# Patient Record
Sex: FEMALE | Race: BLACK OR AFRICAN AMERICAN | Employment: FULL TIME | URBAN - METROPOLITAN AREA
[De-identification: names, ages, dates, MRNs, and addresses within clinical notes are randomized per-mention and may not be internally consistent; named-entity substitution may affect disease eponyms.]

---

## 2017-09-18 PROBLEM — M25.50 POLYARTHRALGIA: Status: ACTIVE | Noted: 2017-09-18

## 2017-09-18 PROCEDURE — 81001 URINALYSIS AUTO W/SCOPE: CPT | Performed by: INTERNAL MEDICINE

## 2017-09-18 PROCEDURE — 82570 ASSAY OF URINE CREATININE: CPT | Performed by: INTERNAL MEDICINE

## 2017-09-18 PROCEDURE — 86160 COMPLEMENT ANTIGEN: CPT | Performed by: INTERNAL MEDICINE

## 2017-09-18 PROCEDURE — 84156 ASSAY OF PROTEIN URINE: CPT | Performed by: INTERNAL MEDICINE

## 2017-09-18 PROCEDURE — 87186 SC STD MICRODIL/AGAR DIL: CPT | Performed by: INTERNAL MEDICINE

## 2017-09-18 PROCEDURE — 86038 ANTINUCLEAR ANTIBODIES: CPT | Performed by: INTERNAL MEDICINE

## 2017-09-18 PROCEDURE — 87077 CULTURE AEROBIC IDENTIFY: CPT | Performed by: INTERNAL MEDICINE

## 2017-09-18 PROCEDURE — 87086 URINE CULTURE/COLONY COUNT: CPT | Performed by: INTERNAL MEDICINE

## 2017-10-10 PROBLEM — M25.50 POLYARTHRALGIA: Status: RESOLVED | Noted: 2017-09-18 | Resolved: 2017-10-10

## 2017-10-10 PROBLEM — M06.9 RHEUMATOID ARTHRITIS (HCC): Status: RESOLVED | Noted: 2017-10-10 | Resolved: 2017-10-10

## 2017-10-10 PROBLEM — M06.9 RHEUMATOID ARTHRITIS (HCC): Status: ACTIVE | Noted: 2017-10-10

## 2017-10-10 PROBLEM — R76.8 RHEUMATOID FACTOR POSITIVE: Status: ACTIVE | Noted: 2017-10-10

## 2017-12-04 PROBLEM — M72.2 PLANTAR FASCIITIS: Status: ACTIVE | Noted: 2017-12-04

## 2017-12-05 PROBLEM — M05.79 RHEUMATOID ARTHRITIS INVOLVING MULTIPLE SITES WITH POSITIVE RHEUMATOID FACTOR (HCC): Status: ACTIVE | Noted: 2017-12-05

## 2017-12-05 PROBLEM — R76.8 RHEUMATOID FACTOR POSITIVE: Status: RESOLVED | Noted: 2017-10-10 | Resolved: 2017-12-05

## 2018-01-19 PROBLEM — M17.12 OSTEOARTHRITIS OF LEFT KNEE: Status: ACTIVE | Noted: 2018-01-19

## 2018-02-08 PROBLEM — M17.0 BILATERAL PRIMARY OSTEOARTHRITIS OF KNEE: Status: ACTIVE | Noted: 2018-02-08

## 2019-04-02 ENCOUNTER — OFFICE VISIT (OUTPATIENT)
Dept: SURGERY | Facility: CLINIC | Age: 54
End: 2019-04-02
Payer: COMMERCIAL

## 2019-04-02 VITALS
SYSTOLIC BLOOD PRESSURE: 169 MMHG | WEIGHT: 249 LBS | TEMPERATURE: 97 F | HEART RATE: 71 BPM | BODY MASS INDEX: 39.54 KG/M2 | DIASTOLIC BLOOD PRESSURE: 105 MMHG | RESPIRATION RATE: 20 BRPM | HEIGHT: 66.5 IN

## 2019-04-02 DIAGNOSIS — K64.5 EXTERNAL HEMORRHOID, THROMBOSED: Primary | ICD-10-CM

## 2019-04-02 PROCEDURE — 99243 OFF/OP CNSLTJ NEW/EST LOW 30: CPT | Performed by: COLON & RECTAL SURGERY

## 2019-04-04 ENCOUNTER — TELEPHONE (OUTPATIENT)
Dept: SURGERY | Facility: CLINIC | Age: 54
End: 2019-04-04

## 2019-04-04 ENCOUNTER — ANESTHESIA EVENT (OUTPATIENT)
Dept: SURGERY | Facility: HOSPITAL | Age: 54
End: 2019-04-04
Payer: COMMERCIAL

## 2019-04-04 DIAGNOSIS — K64.5 EXTERNAL THROMBOSED HEMORRHOIDS: Primary | ICD-10-CM

## 2019-04-04 NOTE — PATIENT INSTRUCTIONS
Assessment   External hemorrhoid, thrombosed  (primary encounter diagnosis)      Plan   Patient has thrombosed external hemorrhoids without active bleeding or tissue breakdown or necrosis.     There is not a discrete clot but can be easily evacuated here in

## 2019-04-04 NOTE — H&P (VIEW-ONLY)
New Patient Visit Note       Active Problems      1.  External hemorrhoid, thrombosed        Chief Complaint   Patient presents with:  Hemorrhoids: anal pain      History of Present Illness   Kyaw Gentleman is a 47year old female referred by Jaya Villegas    •      • TUBAL LIGATION         Family History   Problem Relation Age of Onset   • Asthma Father    • Cancer Son    • Stroke Sister    • Heart Disease Sister    • High Blood Pressure Sister    • Asthma Sister    • Other (lupus) Cousin constipation, diarrhea, nausea and vomiting. Endocrine: Negative for polydipsia and polyuria. Genitourinary: Negative for dysuria and hematuria. Musculoskeletal: Negative for arthralgias and myalgias. Skin: Negative for rash and wound.    Neurologic thrombus did extend somewhat into the anal canal, but not up to the anorectal junction. Anoscopy was not performed.            Assessment   External hemorrhoid, thrombosed  (primary encounter diagnosis)      Plan   Patient has thrombosed external hemorrh

## 2019-04-04 NOTE — H&P
New Patient Visit Note       Active Problems      1.  External hemorrhoid, thrombosed        Chief Complaint   Patient presents with:  Hemorrhoids: anal pain      History of Present Illness   Sunny Ortiz is a 47year old female referred by Gilda Costello    •      • TUBAL LIGATION         Family History   Problem Relation Age of Onset   • Asthma Father    • Cancer Son    • Stroke Sister    • Heart Disease Sister    • High Blood Pressure Sister    • Asthma Sister    • Other (lupus) Cousin constipation, diarrhea, nausea and vomiting. Endocrine: Negative for polydipsia and polyuria. Genitourinary: Negative for dysuria and hematuria. Musculoskeletal: Negative for arthralgias and myalgias. Skin: Negative for rash and wound.    Neurologic thrombus did extend somewhat into the anal canal, but not up to the anorectal junction. Anoscopy was not performed.            Assessment   External hemorrhoid, thrombosed  (primary encounter diagnosis)      Plan   Patient has thrombosed external hemorrh

## 2019-04-04 NOTE — TELEPHONE ENCOUNTER
Surgery scheduled today with Dr. Otoniel Conrad- reviewed surgical check list with pt, Pt verbalizes understanding.

## 2019-04-05 ENCOUNTER — HOSPITAL ENCOUNTER (OUTPATIENT)
Facility: HOSPITAL | Age: 54
Setting detail: HOSPITAL OUTPATIENT SURGERY
Discharge: HOME | End: 2019-04-05
Attending: COLON & RECTAL SURGERY | Admitting: COLON & RECTAL SURGERY
Payer: COMMERCIAL

## 2019-04-05 ENCOUNTER — ANESTHESIA (OUTPATIENT)
Dept: SURGERY | Facility: HOSPITAL | Age: 54
End: 2019-04-05
Payer: COMMERCIAL

## 2019-04-05 VITALS
RESPIRATION RATE: 16 BRPM | DIASTOLIC BLOOD PRESSURE: 94 MMHG | WEIGHT: 239.44 LBS | HEART RATE: 72 BPM | OXYGEN SATURATION: 97 % | SYSTOLIC BLOOD PRESSURE: 146 MMHG | TEMPERATURE: 98 F | BODY MASS INDEX: 38.03 KG/M2 | HEIGHT: 66.5 IN

## 2019-04-05 DIAGNOSIS — K64.5 EXTERNAL THROMBOSED HEMORRHOIDS: ICD-10-CM

## 2019-04-05 PROCEDURE — 46260 REMOVE IN/EX HEM GROUPS 2+: CPT | Performed by: COLON & RECTAL SURGERY

## 2019-04-05 PROCEDURE — 06BY4ZC EXCISION OF HEMORRHOIDAL PLEXUS, PERCUTANEOUS ENDOSCOPIC APPROACH: ICD-10-PCS | Performed by: COLON & RECTAL SURGERY

## 2019-04-05 RX ORDER — SCOLOPAMINE TRANSDERMAL SYSTEM 1 MG/1
1 PATCH, EXTENDED RELEASE TRANSDERMAL ONCE
Status: DISCONTINUED | OUTPATIENT
Start: 2019-04-05 | End: 2019-04-05

## 2019-04-05 RX ORDER — ACETAMINOPHEN 500 MG
1000 TABLET ORAL ONCE
COMMUNITY

## 2019-04-05 RX ORDER — BUPIVACAINE HYDROCHLORIDE AND EPINEPHRINE 2.5; 5 MG/ML; UG/ML
INJECTION, SOLUTION INFILTRATION; PERINEURAL AS NEEDED
Status: DISCONTINUED | OUTPATIENT
Start: 2019-04-05 | End: 2019-04-05 | Stop reason: HOSPADM

## 2019-04-05 RX ORDER — HYDROCODONE BITARTRATE AND ACETAMINOPHEN 5; 325 MG/1; MG/1
1 TABLET ORAL AS NEEDED
Status: DISCONTINUED | OUTPATIENT
Start: 2019-04-05 | End: 2019-04-05

## 2019-04-05 RX ORDER — OXYCODONE HYDROCHLORIDE 5 MG/1
5 TABLET ORAL EVERY 6 HOURS PRN
Qty: 30 TABLET | Refills: 0 | Status: SHIPPED | OUTPATIENT
Start: 2019-04-05

## 2019-04-05 RX ORDER — SODIUM CHLORIDE, SODIUM LACTATE, POTASSIUM CHLORIDE, CALCIUM CHLORIDE 600; 310; 30; 20 MG/100ML; MG/100ML; MG/100ML; MG/100ML
INJECTION, SOLUTION INTRAVENOUS CONTINUOUS
Status: DISCONTINUED | OUTPATIENT
Start: 2019-04-05 | End: 2019-04-05

## 2019-04-05 RX ORDER — METOCLOPRAMIDE HYDROCHLORIDE 5 MG/ML
10 INJECTION INTRAMUSCULAR; INTRAVENOUS AS NEEDED
Status: DISCONTINUED | OUTPATIENT
Start: 2019-04-05 | End: 2019-04-05

## 2019-04-05 RX ORDER — HEPARIN SODIUM 5000 [USP'U]/ML
5000 INJECTION, SOLUTION INTRAVENOUS; SUBCUTANEOUS ONCE
Status: COMPLETED | OUTPATIENT
Start: 2019-04-05 | End: 2019-04-05

## 2019-04-05 RX ORDER — NALOXONE HYDROCHLORIDE 0.4 MG/ML
80 INJECTION, SOLUTION INTRAMUSCULAR; INTRAVENOUS; SUBCUTANEOUS AS NEEDED
Status: DISCONTINUED | OUTPATIENT
Start: 2019-04-05 | End: 2019-04-05

## 2019-04-05 RX ORDER — HYDROCODONE BITARTRATE AND ACETAMINOPHEN 5; 325 MG/1; MG/1
2 TABLET ORAL AS NEEDED
Status: DISCONTINUED | OUTPATIENT
Start: 2019-04-05 | End: 2019-04-05

## 2019-04-05 RX ORDER — HYDROMORPHONE HYDROCHLORIDE 1 MG/ML
0.5 INJECTION, SOLUTION INTRAMUSCULAR; INTRAVENOUS; SUBCUTANEOUS EVERY 5 MIN PRN
Status: DISCONTINUED | OUTPATIENT
Start: 2019-04-05 | End: 2019-04-05

## 2019-04-05 RX ORDER — ONDANSETRON 2 MG/ML
4 INJECTION INTRAMUSCULAR; INTRAVENOUS AS NEEDED
Status: DISCONTINUED | OUTPATIENT
Start: 2019-04-05 | End: 2019-04-05

## 2019-04-05 RX ORDER — ACETAMINOPHEN 500 MG
1000 TABLET ORAL ONCE
Status: DISCONTINUED | OUTPATIENT
Start: 2019-04-05 | End: 2019-04-05

## 2019-04-05 NOTE — ANESTHESIA PREPROCEDURE EVALUATION
PRE-OP EVALUATION    Patient Name: Sabrina Cope    Pre-op Diagnosis: External thrombosed hemorrhoids [K64.5]    Procedure(s):  EXCISIONAL HEMORRHOIDECTOMY    Surgeon(s) and Role:     * Hilda Toney MD - Primary    Pre-op vitals reviewed.   Temp: anesthetic complications         GI/Hepatic/Renal    Negative GI/hepatic/renal ROS. Cardiovascular      ECG reviewed.   Exercise tolerance: good     MET: >4      (+) hypertension and poorly controlled  (+) hyperlipidemia

## 2019-04-05 NOTE — ANESTHESIA POSTPROCEDURE EVALUATION
[facility] Patient Status:  Hospital Outpatient Surgery   Age/Gender 47year old female MRN [id number]   Location [address] Attending Song Barriga MD   Hosp Day # 0 PCP Ramila North MD

## 2019-04-05 NOTE — INTERVAL H&P NOTE
Pre-op Diagnosis: External thrombosed hemorrhoids [K64.5]    The above referenced H&P was reviewed by Mercy Weinstein MD on 4/5/2019, the patient was examined and no significant changes have occurred in the patient's condition since the H&P was perfo

## 2019-04-05 NOTE — OPERATIVE REPORT
[facility]  Operative Report    Huan Sweeney Location: OR   [facility] [id number] N [id number]   Admission Date 4/5/2019 Operation Date 4/5/2019   Attending Physician Mya Christianson MD Operating Physician Marco Antonio Mccrary MD       Patient Name Betadine paint. Sterile drapes were placed with wide exposure of the perineum. Anoscopy was performed to reveal the above listed findings. Each hemorrhoid was treated in an identical fashion.      0.5% Marcaine with epinephrine was injected into all hemo

## 2019-04-21 NOTE — PROGRESS NOTES
Post Operative Visit Note       Active Problems  1. Follow-up examination    2. External thrombosed hemorrhoids    3.  Grade III internal hemorrhoids         Chief Complaint   Patient presents with:  Hemorrhoids: Pain, bleeding         History of Present Il 4/5/2019    Performed by Elizabeth Lopez MD at [facility] MAIN OR   • TUBAL LIGATION         The family history and social history have been reviewed by me today.     Family History   Problem Relation Age of Onset   • Asthma Father    • Cancer Son    • Yogi Silverman Review of Systems  The Review of Systems has been reviewed by me during today. Review of Systems   Constitutional: Negative for chills, diaphoresis, fatigue and fever. HENT: Negative for rhinorrhea, sore throat and trouble swallowing.     Eyes: Negativ skin were examined. The left lateral right posterior hemorrhoidectomy incisions were intact and there was a small amount of extruding Vicryl suture. This was snipped at the skin level. There was no bleeding. There is no erythema or induration.   Ther

## 2019-04-22 ENCOUNTER — OFFICE VISIT (OUTPATIENT)
Dept: SURGERY | Facility: CLINIC | Age: 54
End: 2019-04-22

## 2019-04-22 VITALS
WEIGHT: 249 LBS | SYSTOLIC BLOOD PRESSURE: 146 MMHG | TEMPERATURE: 98 F | HEIGHT: 66.5 IN | DIASTOLIC BLOOD PRESSURE: 97 MMHG | BODY MASS INDEX: 39.54 KG/M2 | HEART RATE: 71 BPM

## 2019-04-22 DIAGNOSIS — K64.5 EXTERNAL THROMBOSED HEMORRHOIDS: ICD-10-CM

## 2019-04-22 DIAGNOSIS — K64.2 GRADE III INTERNAL HEMORRHOIDS: ICD-10-CM

## 2019-04-22 DIAGNOSIS — Z09 FOLLOW-UP EXAMINATION: Primary | ICD-10-CM

## 2019-04-22 PROCEDURE — 99024 POSTOP FOLLOW-UP VISIT: CPT | Performed by: COLON & RECTAL SURGERY

## 2019-04-22 NOTE — PATIENT INSTRUCTIONS
Assessment   Follow-up examination  (primary encounter diagnosis)  External thrombosed hemorrhoids  Grade III internal hemorrhoids      Plan   The patient is doing well after 17 days status post excisional hemorrhoidectomy.     Pathology results were discus

## 2019-05-06 ENCOUNTER — OFFICE VISIT (OUTPATIENT)
Dept: SURGERY | Facility: CLINIC | Age: 54
End: 2019-05-06
Payer: COMMERCIAL

## 2019-05-06 VITALS
HEART RATE: 64 BPM | BODY MASS INDEX: 39.54 KG/M2 | RESPIRATION RATE: 18 BRPM | DIASTOLIC BLOOD PRESSURE: 96 MMHG | HEIGHT: 66.5 IN | SYSTOLIC BLOOD PRESSURE: 153 MMHG | WEIGHT: 249 LBS

## 2019-05-06 DIAGNOSIS — K64.5 EXTERNAL THROMBOSED HEMORRHOIDS: ICD-10-CM

## 2019-05-06 DIAGNOSIS — K64.2 GRADE III INTERNAL HEMORRHOIDS: ICD-10-CM

## 2019-05-06 DIAGNOSIS — Z09 FOLLOW-UP EXAMINATION: Primary | ICD-10-CM

## 2019-05-06 PROCEDURE — 46600 DIAGNOSTIC ANOSCOPY SPX: CPT | Performed by: COLON & RECTAL SURGERY

## 2019-05-06 PROCEDURE — 99024 POSTOP FOLLOW-UP VISIT: CPT | Performed by: COLON & RECTAL SURGERY

## 2019-05-06 NOTE — PROGRESS NOTES
Post Operative Visit Note       Active Problems  No diagnosis found. Chief Complaint   Patient presents with:  Post-Op: 2wk f/u s/p hemorrhoidectomy on 4/5. History of Present Illness         Allergies  Katie has No Known Allergies.     Past M 1 tablet (81 mg total) by mouth daily. , Disp: , Rfl: 0  •  oxyCODONE HCl 5 MG Oral Tab, Take 1 tablet (5 mg total) by mouth every 6 (six) hours as needed for Pain., Disp: 30 tablet, Rfl: 0  •  Hydrocortisone Ace-Pramoxine 2.5-1 % Rectal Cream, Apply to aff Gastrointestinal: Negative for abdominal distention, abdominal pain, anal bleeding, blood in stool, constipation, diarrhea, nausea and vomiting. Genitourinary: Negative for difficulty urinating, dysuria, frequency and urgency.    Musculoskeletal: Tor Stamp

## 2019-05-06 NOTE — PATIENT INSTRUCTIONS
Assessment   Follow-up examination  (primary encounter diagnosis)  External thrombosed hemorrhoids  Grade III internal hemorrhoids      Plan   The patient is doing well 1 month after 2 column hemorrhoidectomy.     Patient's incisions are almost completely

## 2019-05-06 NOTE — PROGRESS NOTES
Post Operative Visit Note       Active Problems  1. Follow-up examination    2. External thrombosed hemorrhoids    3.  Grade III internal hemorrhoids         Chief Complaint   Patient presents with:  Post-Op: Improving anal pain         History of Present I Sister    • Asthma Sister    • Other (lupus) Cousin      Social History    Tobacco Use      Smoking status: Never Smoker      Smokeless tobacco: Never Used    Alcohol use: Yes      Comment: social    Drug use: No       Current Outpatient Medications on Jaden shortness of breath and wheezing. Cardiovascular: Negative for chest pain and palpitations. Gastrointestinal: Negative for abdominal distention, abdominal pain, anal bleeding, blood in stool, constipation, diarrhea, nausea, rectal pain and vomiting. was performed. There was  good resting anal sphincter tone and no palpable mass. Anoscopy was performed. The anal canal and anorectal ring were examined circumferentially. There was not anal fissure and anal fistula.     There was no inflammation at th

## 2020-09-15 ENCOUNTER — APPOINTMENT (OUTPATIENT)
Dept: LAB | Facility: HOSPITAL | Age: 55
End: 2020-09-15
Attending: FAMILY MEDICINE
Payer: COMMERCIAL

## 2020-09-15 DIAGNOSIS — R52 BODY ACHES: ICD-10-CM

## 2020-09-15 DIAGNOSIS — Z20.822 SUSPECTED COVID-19 VIRUS INFECTION: ICD-10-CM

## 2020-09-15 DIAGNOSIS — R05.9 COUGH: ICD-10-CM

## 2020-09-15 DIAGNOSIS — R49.0 HOARSENESS OF VOICE: ICD-10-CM

## 2020-09-18 LAB — SARS-COV-2 RNA RESP QL NAA+PROBE: NOT DETECTED

## 2020-12-16 PROBLEM — Z79.899 HIGH RISK MEDICATION USE: Status: ACTIVE | Noted: 2020-12-16

## 2022-01-07 ENCOUNTER — EXTERNAL RECORD (OUTPATIENT)
Dept: HEALTH INFORMATION MANAGEMENT | Facility: OTHER | Age: 57
End: 2022-01-07

## 2022-01-07 ENCOUNTER — TELEPHONE (OUTPATIENT)
Dept: ORTHOPEDICS | Age: 57
End: 2022-01-07

## 2022-01-08 ENCOUNTER — APPOINTMENT (OUTPATIENT)
Dept: OTHER | Facility: PHYSICIAN GROUP | Age: 57
End: 2022-01-08

## 2022-01-08 PROCEDURE — 99222 1ST HOSP IP/OBS MODERATE 55: CPT | Performed by: ORTHOPAEDIC SURGERY

## 2022-01-08 PROCEDURE — 27814 TREATMENT OF ANKLE FRACTURE: CPT | Performed by: ORTHOPAEDIC SURGERY

## 2022-01-11 ENCOUNTER — TELEPHONE (OUTPATIENT)
Dept: ORTHOPEDICS | Age: 57
End: 2022-01-11

## 2022-01-12 ENCOUNTER — MED INFO FORMS (OUTPATIENT)
Dept: ORTHOPEDICS | Age: 57
End: 2022-01-12

## 2022-01-20 ENCOUNTER — MED INFO FORMS (OUTPATIENT)
Dept: GENERAL RADIOLOGY | Age: 57
End: 2022-01-20

## 2022-01-28 ENCOUNTER — OFFICE VISIT (OUTPATIENT)
Dept: ORTHOPEDICS | Age: 57
End: 2022-01-28

## 2022-01-28 ENCOUNTER — IMAGING SERVICES (OUTPATIENT)
Dept: GENERAL RADIOLOGY | Age: 57
End: 2022-01-28
Attending: ORTHOPAEDIC SURGERY

## 2022-01-28 DIAGNOSIS — S82.891D CLOSED FRACTURE OF RIGHT ANKLE WITH ROUTINE HEALING, SUBSEQUENT ENCOUNTER: ICD-10-CM

## 2022-01-28 DIAGNOSIS — S82.841D CLOSED BIMALLEOLAR FRACTURE OF RIGHT ANKLE WITH ROUTINE HEALING, SUBSEQUENT ENCOUNTER: Primary | ICD-10-CM

## 2022-01-28 DIAGNOSIS — S93.04XD CLOSED DISLOCATION OF RIGHT ANKLE, SUBSEQUENT ENCOUNTER: ICD-10-CM

## 2022-01-28 PROCEDURE — 73600 X-RAY EXAM OF ANKLE: CPT | Performed by: ORTHOPAEDIC SURGERY

## 2022-01-28 PROCEDURE — 99024 POSTOP FOLLOW-UP VISIT: CPT | Performed by: ORTHOPAEDIC SURGERY

## 2022-01-28 RX ORDER — SPIRONOLACTONE 25 MG/1
TABLET ORAL
COMMUNITY
Start: 2022-01-12

## 2022-01-28 RX ORDER — OMEGA-3/DHA/EPA/FISH OIL 100-150 MG
CAPSULE ORAL
COMMUNITY

## 2022-01-28 RX ORDER — GLUCOSAM/CHONDRO/HERB 149/HYAL 750-100 MG
TABLET ORAL
COMMUNITY

## 2022-01-28 RX ORDER — IBUPROFEN 800 MG/1
800 TABLET ORAL
COMMUNITY
End: 2022-09-28 | Stop reason: CLARIF

## 2022-01-28 RX ORDER — ACETAMINOPHEN 500 MG
1000 TABLET ORAL
COMMUNITY

## 2022-01-28 RX ORDER — OLMESARTAN MEDOXOMIL AND HYDROCHLOROTHIAZIDE 40/25 40; 25 MG/1; MG/1
1 TABLET ORAL DAILY
COMMUNITY

## 2022-01-28 RX ORDER — HYDROCODONE BITARTRATE AND ACETAMINOPHEN 10; 325 MG/1; MG/1
1 TABLET ORAL EVERY 6 HOURS PRN
Qty: 30 TABLET | Refills: 0 | Status: SHIPPED | OUTPATIENT
Start: 2022-01-28 | End: 2022-12-28 | Stop reason: ALTCHOICE

## 2022-01-28 RX ORDER — TEMAZEPAM 7.5 MG/1
CAPSULE ORAL
COMMUNITY
Start: 2021-12-18

## 2022-01-28 RX ORDER — OLMESARTAN MEDOXOMIL 40 MG/1
TABLET ORAL
COMMUNITY
Start: 2022-01-15

## 2022-01-28 RX ORDER — ASPIRIN 81 MG/1
81 TABLET ORAL
COMMUNITY
Start: 2022-01-09

## 2022-01-28 RX ORDER — HYDROCODONE BITARTRATE AND ACETAMINOPHEN 10; 325 MG/1; MG/1
1 TABLET ORAL
COMMUNITY
Start: 2022-01-09 | End: 2022-12-28 | Stop reason: ALTCHOICE

## 2022-01-28 RX ORDER — METOPROLOL SUCCINATE 50 MG/1
50 TABLET, EXTENDED RELEASE ORAL DAILY
COMMUNITY

## 2022-01-28 RX ORDER — ATORVASTATIN CALCIUM 20 MG/1
20 TABLET, FILM COATED ORAL NIGHTLY
COMMUNITY

## 2022-02-09 ENCOUNTER — NURSE ONLY (OUTPATIENT)
Dept: ORTHOPEDICS | Age: 57
End: 2022-02-09

## 2022-02-09 ENCOUNTER — TELEPHONE (OUTPATIENT)
Dept: ORTHOPEDICS | Age: 57
End: 2022-02-09

## 2022-02-09 DIAGNOSIS — Z98.890 POST-OPERATIVE STATE: ICD-10-CM

## 2022-02-09 DIAGNOSIS — S93.04XD CLOSED DISLOCATION OF RIGHT ANKLE, SUBSEQUENT ENCOUNTER: Primary | ICD-10-CM

## 2022-02-09 DIAGNOSIS — S82.841D CLOSED BIMALLEOLAR FRACTURE OF RIGHT ANKLE WITH ROUTINE HEALING, SUBSEQUENT ENCOUNTER: ICD-10-CM

## 2022-02-09 PROCEDURE — L4387 NON-PNEUM WALK BOOT PRE OTS: HCPCS | Performed by: ORTHOPAEDIC SURGERY

## 2022-02-09 RX ORDER — HYDROCODONE BITARTRATE AND ACETAMINOPHEN 5; 325 MG/1; MG/1
TABLET ORAL
Qty: 40 TABLET | Refills: 0 | Status: SHIPPED | OUTPATIENT
Start: 2022-02-09 | End: 2022-12-28 | Stop reason: ALTCHOICE

## 2022-02-11 ENCOUNTER — MED INFO FORMS (OUTPATIENT)
Dept: ORTHOPEDICS | Age: 57
End: 2022-02-11

## 2022-02-25 ENCOUNTER — OFFICE VISIT (OUTPATIENT)
Dept: ORTHOPEDICS | Age: 57
End: 2022-02-25

## 2022-02-25 ENCOUNTER — IMAGING SERVICES (OUTPATIENT)
Dept: GENERAL RADIOLOGY | Age: 57
End: 2022-02-25
Attending: ORTHOPAEDIC SURGERY

## 2022-02-25 DIAGNOSIS — S93.04XD CLOSED DISLOCATION OF RIGHT ANKLE, SUBSEQUENT ENCOUNTER: ICD-10-CM

## 2022-02-25 DIAGNOSIS — Z98.890 POST-OPERATIVE STATE: ICD-10-CM

## 2022-02-25 DIAGNOSIS — S93.04XD CLOSED DISLOCATION OF RIGHT ANKLE, SUBSEQUENT ENCOUNTER: Primary | ICD-10-CM

## 2022-02-25 PROCEDURE — 73600 X-RAY EXAM OF ANKLE: CPT | Performed by: ORTHOPAEDIC SURGERY

## 2022-02-25 PROCEDURE — 99024 POSTOP FOLLOW-UP VISIT: CPT | Performed by: ORTHOPAEDIC SURGERY

## 2022-02-25 RX ORDER — HYDROCODONE BITARTRATE AND ACETAMINOPHEN 5; 325 MG/1; MG/1
1 TABLET ORAL EVERY 6 HOURS PRN
Qty: 20 TABLET | Refills: 0 | Status: SHIPPED | OUTPATIENT
Start: 2022-02-25 | End: 2022-12-28 | Stop reason: ALTCHOICE

## 2022-03-02 ENCOUNTER — MED INFO FORMS (OUTPATIENT)
Dept: ORTHOPEDICS | Age: 57
End: 2022-03-02

## 2022-04-01 ENCOUNTER — IMAGING SERVICES (OUTPATIENT)
Dept: GENERAL RADIOLOGY | Age: 57
End: 2022-04-01

## 2022-04-01 ENCOUNTER — OFFICE VISIT (OUTPATIENT)
Dept: ORTHOPEDICS | Age: 57
End: 2022-04-01

## 2022-04-01 VITALS — BODY MASS INDEX: 34.53 KG/M2 | WEIGHT: 220 LBS | HEIGHT: 67 IN

## 2022-04-01 DIAGNOSIS — Z98.890 POST-OPERATIVE STATE: ICD-10-CM

## 2022-04-01 DIAGNOSIS — S93.04XD CLOSED DISLOCATION OF RIGHT ANKLE, SUBSEQUENT ENCOUNTER: Primary | ICD-10-CM

## 2022-04-01 DIAGNOSIS — S93.04XD CLOSED DISLOCATION OF RIGHT ANKLE, SUBSEQUENT ENCOUNTER: ICD-10-CM

## 2022-04-01 PROCEDURE — 99024 POSTOP FOLLOW-UP VISIT: CPT | Performed by: ORTHOPAEDIC SURGERY

## 2022-04-01 PROCEDURE — 73600 X-RAY EXAM OF ANKLE: CPT | Performed by: ORTHOPAEDIC SURGERY

## 2022-04-07 ENCOUNTER — TELEPHONE (OUTPATIENT)
Dept: ORTHOPEDICS | Age: 57
End: 2022-04-07

## 2022-04-08 ENCOUNTER — MED INFO FORMS (OUTPATIENT)
Dept: HEALTH INFORMATION MANAGEMENT | Facility: OTHER | Age: 57
End: 2022-04-08

## 2022-05-02 ENCOUNTER — TELEPHONE (OUTPATIENT)
Dept: ORTHOPEDICS | Age: 57
End: 2022-05-02

## 2022-05-19 ENCOUNTER — MED INFO FORMS (OUTPATIENT)
Dept: HEALTH INFORMATION MANAGEMENT | Facility: OTHER | Age: 57
End: 2022-05-19

## 2022-06-01 ENCOUNTER — OFFICE VISIT (OUTPATIENT)
Dept: ORTHOPEDICS | Age: 57
End: 2022-06-01

## 2022-06-01 DIAGNOSIS — M67.01 ACQUIRED TIGHT ACHILLES TENDON, RIGHT: ICD-10-CM

## 2022-06-01 DIAGNOSIS — S82.841D CLOSED BIMALLEOLAR FRACTURE OF RIGHT ANKLE WITH ROUTINE HEALING, SUBSEQUENT ENCOUNTER: Primary | ICD-10-CM

## 2022-06-01 DIAGNOSIS — S93.04XD CLOSED DISLOCATION OF RIGHT ANKLE, SUBSEQUENT ENCOUNTER: ICD-10-CM

## 2022-06-01 PROCEDURE — 99213 OFFICE O/P EST LOW 20 MIN: CPT | Performed by: ORTHOPAEDIC SURGERY

## 2022-06-06 ENCOUNTER — MED INFO FORMS (OUTPATIENT)
Dept: HEALTH INFORMATION MANAGEMENT | Facility: OTHER | Age: 57
End: 2022-06-06

## 2022-09-02 ENCOUNTER — APPOINTMENT (OUTPATIENT)
Dept: ORTHOPEDICS | Age: 57
End: 2022-09-02

## 2022-09-28 ENCOUNTER — OFFICE VISIT (OUTPATIENT)
Dept: ORTHOPEDICS | Age: 57
End: 2022-09-28

## 2022-09-28 VITALS — WEIGHT: 236 LBS | BODY MASS INDEX: 37.04 KG/M2 | HEIGHT: 67 IN

## 2022-09-28 DIAGNOSIS — S93.04XD CLOSED DISLOCATION OF RIGHT ANKLE, SUBSEQUENT ENCOUNTER: ICD-10-CM

## 2022-09-28 DIAGNOSIS — S82.841D CLOSED BIMALLEOLAR FRACTURE OF RIGHT ANKLE WITH ROUTINE HEALING, SUBSEQUENT ENCOUNTER: Primary | ICD-10-CM

## 2022-09-28 PROCEDURE — 99213 OFFICE O/P EST LOW 20 MIN: CPT | Performed by: ORTHOPAEDIC SURGERY

## 2022-09-28 RX ORDER — MELOXICAM 15 MG/1
1 TABLET ORAL DAILY
COMMUNITY

## 2022-12-28 ENCOUNTER — IMAGING SERVICES (OUTPATIENT)
Dept: GENERAL RADIOLOGY | Age: 57
End: 2022-12-28
Attending: ORTHOPAEDIC SURGERY

## 2022-12-28 ENCOUNTER — OFFICE VISIT (OUTPATIENT)
Dept: ORTHOPEDICS | Age: 57
End: 2022-12-28

## 2022-12-28 DIAGNOSIS — Z98.890 HISTORY OF ANKLE SURGERY: ICD-10-CM

## 2022-12-28 DIAGNOSIS — S93.04XD CLOSED DISLOCATION OF RIGHT ANKLE, SUBSEQUENT ENCOUNTER: ICD-10-CM

## 2022-12-28 DIAGNOSIS — S82.841D CLOSED BIMALLEOLAR FRACTURE OF RIGHT ANKLE WITH ROUTINE HEALING, SUBSEQUENT ENCOUNTER: ICD-10-CM

## 2022-12-28 DIAGNOSIS — M19.171 POST-TRAUMATIC OSTEOARTHRITIS OF RIGHT ANKLE: Primary | ICD-10-CM

## 2022-12-28 PROCEDURE — 20605 DRAIN/INJ JOINT/BURSA W/O US: CPT | Performed by: ORTHOPAEDIC SURGERY

## 2022-12-28 PROCEDURE — 73600 X-RAY EXAM OF ANKLE: CPT | Performed by: RADIOLOGY

## 2022-12-28 PROCEDURE — 99213 OFFICE O/P EST LOW 20 MIN: CPT | Performed by: ORTHOPAEDIC SURGERY

## 2022-12-28 RX ORDER — TRIAMCINOLONE ACETONIDE 40 MG/ML
40 INJECTION, SUSPENSION INTRA-ARTICULAR; INTRAMUSCULAR
Status: COMPLETED | OUTPATIENT
Start: 2022-12-28 | End: 2022-12-28

## 2022-12-28 RX ORDER — BUPIVACAINE HYDROCHLORIDE 5 MG/ML
2 INJECTION, SOLUTION PERINEURAL
Status: COMPLETED | OUTPATIENT
Start: 2022-12-28 | End: 2022-12-28

## 2022-12-28 RX ADMIN — BUPIVACAINE HYDROCHLORIDE 2 ML: 5 INJECTION, SOLUTION PERINEURAL at 09:23

## 2022-12-28 RX ADMIN — TRIAMCINOLONE ACETONIDE 40 MG: 40 INJECTION, SUSPENSION INTRA-ARTICULAR; INTRAMUSCULAR at 09:23

## 2023-02-28 ENCOUNTER — APPOINTMENT (OUTPATIENT)
Dept: GENERAL RADIOLOGY | Age: 58
End: 2023-02-28
Attending: EMERGENCY MEDICINE
Payer: COMMERCIAL

## 2023-02-28 ENCOUNTER — HOSPITAL ENCOUNTER (EMERGENCY)
Age: 58
Discharge: HOME | End: 2023-02-28
Attending: EMERGENCY MEDICINE
Payer: COMMERCIAL

## 2023-02-28 VITALS
DIASTOLIC BLOOD PRESSURE: 86 MMHG | RESPIRATION RATE: 18 BRPM | BODY MASS INDEX: 37.77 KG/M2 | HEART RATE: 83 BPM | SYSTOLIC BLOOD PRESSURE: 148 MMHG | WEIGHT: 235 LBS | HEIGHT: 66 IN | OXYGEN SATURATION: 96 % | TEMPERATURE: 98 F

## 2023-02-28 DIAGNOSIS — M19.171 POST-TRAUMATIC OSTEOARTHRITIS OF RIGHT ANKLE: Primary | ICD-10-CM

## 2023-02-28 PROCEDURE — 99284 EMERGENCY DEPT VISIT MOD MDM: CPT

## 2023-02-28 PROCEDURE — 73610 X-RAY EXAM OF ANKLE: CPT | Performed by: EMERGENCY MEDICINE

## 2023-02-28 PROCEDURE — 99283 EMERGENCY DEPT VISIT LOW MDM: CPT

## 2023-02-28 RX ORDER — SPIRONOLACTONE 25 MG/1
TABLET ORAL
COMMUNITY
Start: 2022-01-12

## 2023-03-01 NOTE — DISCHARGE INSTRUCTIONS
The best treatment for minor injuries is R.I.C.E. and NSAID medications. R.I.C.E. = Rest  Ice  Compression  Elevation      Rest: Do not use the injured body part unnecessarily. If this is a lower extremity, do not take long walks, run or do anything that causes increased pain. Gradually progress to your normal activity, using pain as your guide. Ice: Apply cold compresses to the injured area. The area that is injured is inflammed. Inflammation causes warmth, so ice may give some relief. You may ice through a brace or ace wrap. Compression: Compression to the area will help control swelling. An ace wrap is the most simple form of compression. You can also wear a brace. Elevation: Raise the injured extremity above heart level. This will reduce throbbing pain and swelling associated with injures. Prop the injured extremity up with pillows while lying down.

## 2023-03-13 ENCOUNTER — TELEPHONE (OUTPATIENT)
Dept: ORTHOPEDICS | Age: 58
End: 2023-03-13

## 2023-03-22 ENCOUNTER — OFFICE VISIT (OUTPATIENT)
Dept: ORTHOPEDICS CLINIC | Facility: CLINIC | Age: 58
End: 2023-03-22
Payer: COMMERCIAL

## 2023-03-22 VITALS — WEIGHT: 235 LBS | HEIGHT: 66.5 IN | BODY MASS INDEX: 37.32 KG/M2

## 2023-03-22 DIAGNOSIS — M19.071 ARTHRITIS OF RIGHT ANKLE: ICD-10-CM

## 2023-03-22 DIAGNOSIS — M25.571 CHRONIC PAIN OF RIGHT ANKLE: ICD-10-CM

## 2023-03-22 DIAGNOSIS — M65.9 SYNOVITIS OF ANKLE: ICD-10-CM

## 2023-03-22 DIAGNOSIS — M05.79 RHEUMATOID ARTHRITIS INVOLVING MULTIPLE SITES WITH POSITIVE RHEUMATOID FACTOR (HCC): ICD-10-CM

## 2023-03-22 DIAGNOSIS — G89.29 CHRONIC PAIN OF RIGHT ANKLE: ICD-10-CM

## 2023-03-22 DIAGNOSIS — M21.079 ACQUIRED VALGUS DEFORMITY OF ANKLE: Primary | ICD-10-CM

## 2023-03-22 PROCEDURE — 3008F BODY MASS INDEX DOCD: CPT | Performed by: PODIATRIST

## 2023-03-22 PROCEDURE — 99204 OFFICE O/P NEW MOD 45 MIN: CPT | Performed by: PODIATRIST

## 2023-03-22 RX ORDER — METHYLPREDNISOLONE 4 MG/1
TABLET ORAL
Qty: 1 EACH | Refills: 1 | Status: SHIPPED | OUTPATIENT
Start: 2023-03-22

## 2023-03-29 ENCOUNTER — APPOINTMENT (OUTPATIENT)
Dept: ORTHOPEDICS | Age: 58
End: 2023-03-29

## 2023-05-03 ENCOUNTER — APPOINTMENT (OUTPATIENT)
Dept: PODIATRY | Age: 58
End: 2023-05-03

## (undated) DEVICE — SUTURE VICRYL 3-0 SH

## (undated) DEVICE — KENDALL SCD EXPRESS SLEEVES, KNEE LENGTH, MEDIUM: Brand: KENDALL SCD

## (undated) DEVICE — GAUZE SPONGES,USP TYPE VII GAUZE, 12 PLY: Brand: CURITY

## (undated) DEVICE — REM POLYHESIVE ADULT PATIENT RETURN ELECTRODE: Brand: VALLEYLAB

## (undated) DEVICE — MINI LAP PACK-LF: Brand: MEDLINE INDUSTRIES, INC.

## (undated) DEVICE — LAPAROTOMY SPONGE - RF AND X-RAY DETECTABLE PRE-WASHED: Brand: SITUATE

## (undated) DEVICE — NEEDLE SPINAL 22X3-1/2 BLK

## (undated) DEVICE — ABSORBABLE HEMOSTAT (OXIDIZED REGENERATED CELLULOSE, U.S.P.): Brand: SURGICEL

## (undated) DEVICE — SUTURE VICRYL 2-0 SH

## (undated) DEVICE — DRAPE,UNDRBUT,WHT GRAD PCH,CAPPORT,20/CS: Brand: MEDLINE

## (undated) DEVICE — STERILE POLYISOPRENE POWDER-FREE SURGICAL GLOVES: Brand: PROTEXIS

## (undated) DEVICE — SPONGE STICK WITH PVP-I: Brand: KENDALL

## (undated) DEVICE — DRAPE LEGGING STERILE

## (undated) DEVICE — BRIEF INCONTINENCE ADULT 2XL

## (undated) DEVICE — SOL  .9 1000ML BTL

## (undated) NOTE — LETTER
05/06/19    Dear Heath Galvan MD,    I am seeing Les Katz in the office today 1 month after excisional hemorrhoid ectomy.             Assessment   Follow-up examination  (primary encounter diagnosis)  External thrombosed hemorrhoids  Grade III

## (undated) NOTE — LETTER
04/22/19    Dear Sheela Koehler MD,    I am seeing Flores Quintero in the office today after excisional hemorrhoidectomy.             Assessment   Follow-up examination  (primary encounter diagnosis)  External thrombosed hemorrhoids  Grade III internal

## (undated) NOTE — LETTER
19    Patient: Ele Arroyo  : 3/31/1965 Visit date: 2019    Dear  Dr. Stevie Renee MD,    Thank you for referring Ele Arroyo to my practice. Please find my assessment and plan below.                 Assessment   External hemorrhoid, thrombo